# Patient Record
Sex: FEMALE | URBAN - METROPOLITAN AREA
[De-identification: names, ages, dates, MRNs, and addresses within clinical notes are randomized per-mention and may not be internally consistent; named-entity substitution may affect disease eponyms.]

---

## 2024-01-29 ENCOUNTER — HOSPITAL ENCOUNTER (EMERGENCY)
Facility: HOSPITAL | Age: 29
Discharge: HOME | End: 2024-01-30

## 2024-01-29 VITALS
HEIGHT: 65 IN | HEART RATE: 83 BPM | OXYGEN SATURATION: 100 % | BODY MASS INDEX: 32.99 KG/M2 | WEIGHT: 198 LBS | SYSTOLIC BLOOD PRESSURE: 102 MMHG | DIASTOLIC BLOOD PRESSURE: 68 MMHG | TEMPERATURE: 96.9 F | RESPIRATION RATE: 18 BRPM

## 2024-01-29 DIAGNOSIS — R35.0 URINARY FREQUENCY: ICD-10-CM

## 2024-01-29 DIAGNOSIS — K59.00 CONSTIPATION, UNSPECIFIED CONSTIPATION TYPE: Primary | ICD-10-CM

## 2024-01-29 PROCEDURE — 99283 EMERGENCY DEPT VISIT LOW MDM: CPT

## 2024-01-29 PROCEDURE — 99284 EMERGENCY DEPT VISIT MOD MDM: CPT

## 2024-01-29 ASSESSMENT — COLUMBIA-SUICIDE SEVERITY RATING SCALE - C-SSRS
1. IN THE PAST MONTH, HAVE YOU WISHED YOU WERE DEAD OR WISHED YOU COULD GO TO SLEEP AND NOT WAKE UP?: NO
6. HAVE YOU EVER DONE ANYTHING, STARTED TO DO ANYTHING, OR PREPARED TO DO ANYTHING TO END YOUR LIFE?: NO
2. HAVE YOU ACTUALLY HAD ANY THOUGHTS OF KILLING YOURSELF?: NO

## 2024-01-29 NOTE — Clinical Note
Radha Alvares was seen and treated in our emergency department on 1/29/2024.  She may return to work on 01/31/2024.       If you have any questions or concerns, please don't hesitate to call.      Angella Orr PA-C

## 2024-01-30 ENCOUNTER — APPOINTMENT (OUTPATIENT)
Dept: RADIOLOGY | Facility: HOSPITAL | Age: 29
End: 2024-01-30

## 2024-01-30 LAB
APPEARANCE UR: CLEAR
BILIRUB UR STRIP.AUTO-MCNC: NEGATIVE MG/DL
COLOR UR: YELLOW
GLUCOSE UR STRIP.AUTO-MCNC: NEGATIVE MG/DL
HOLD SPECIMEN: NORMAL
KETONES UR STRIP.AUTO-MCNC: NEGATIVE MG/DL
LEUKOCYTE ESTERASE UR QL STRIP.AUTO: ABNORMAL
MUCOUS THREADS #/AREA URNS AUTO: NORMAL /LPF
NITRITE UR QL STRIP.AUTO: NEGATIVE
PH UR STRIP.AUTO: 8 [PH]
PREGNANCY TEST URINE, POC: NEGATIVE
PROT UR STRIP.AUTO-MCNC: NEGATIVE MG/DL
RBC # UR STRIP.AUTO: NEGATIVE /UL
RBC #/AREA URNS AUTO: NORMAL /HPF
SP GR UR STRIP.AUTO: 1.02
UROBILINOGEN UR STRIP.AUTO-MCNC: 2 MG/DL
WBC #/AREA URNS AUTO: NORMAL /HPF

## 2024-01-30 PROCEDURE — 74018 RADEX ABDOMEN 1 VIEW: CPT

## 2024-01-30 PROCEDURE — 74018 RADEX ABDOMEN 1 VIEW: CPT | Performed by: STUDENT IN AN ORGANIZED HEALTH CARE EDUCATION/TRAINING PROGRAM

## 2024-01-30 PROCEDURE — 81001 URINALYSIS AUTO W/SCOPE: CPT | Performed by: EMERGENCY MEDICINE

## 2024-01-30 PROCEDURE — 81025 URINE PREGNANCY TEST: CPT

## 2024-01-30 RX ORDER — POLYETHYLENE GLYCOL 3350 17 G/17G
17 POWDER, FOR SOLUTION ORAL DAILY
Qty: 51 G | Refills: 0 | Status: SHIPPED | OUTPATIENT
Start: 2024-01-30 | End: 2024-01-30 | Stop reason: SDUPTHER

## 2024-01-30 RX ORDER — DOCUSATE SODIUM 100 MG/1
200 CAPSULE, LIQUID FILLED ORAL EVERY 12 HOURS
Qty: 8 CAPSULE | Refills: 0 | Status: SHIPPED | OUTPATIENT
Start: 2024-01-30 | End: 2024-02-01

## 2024-01-30 RX ORDER — POLYETHYLENE GLYCOL 3350 17 G/17G
17 POWDER, FOR SOLUTION ORAL DAILY
Qty: 51 G | Refills: 0 | Status: SHIPPED | OUTPATIENT
Start: 2024-01-30 | End: 2024-02-02

## 2024-01-30 RX ORDER — DOCUSATE SODIUM 100 MG/1
200 CAPSULE, LIQUID FILLED ORAL EVERY 12 HOURS
Qty: 8 CAPSULE | Refills: 0 | Status: SHIPPED | OUTPATIENT
Start: 2024-01-30 | End: 2024-01-30 | Stop reason: SDUPTHER

## 2024-01-30 NOTE — DISCHARGE INSTRUCTIONS
Begin taking the bowel regiment I prescribed as directed.  Be sure to increase your daily fluid intake and drink at least eight 8 fluid ounce glasses of water per day.  You may also consider drinking some prune juice as this can also help stimulate bowel movements.    Please return to the ED with any new or worsening symptoms or if you are no longer passing gas and still unable to have a bowel movement even after this treatment.    Your urine analysis results revealed no signs of acute UTI.  Therefore you do not need any antibiotics at this time.  Your abdominal pain is likely secondary to being constipated as x-ray imaging showed a large amount of stool within your bowel.

## 2024-01-30 NOTE — ED TRIAGE NOTES
"Pt is concerned for kidney infection and no BM for 8 days. Having increased stomach and back pain that started \"a while\" and is progressing and today it got \"unbearable.\" Pt admits to taking a stool softener with no relief.   "

## 2024-01-30 NOTE — ED PROVIDER NOTES
HPI    CC: Constipation, Urinary frequency/urge  HPI:   Radha Alvares is a 28 year old female with no reported PMH whom presents today with complaints of increased urinary frequency and urge for the past several weeks as well as 5 days of constipation and generalized abdominal discomfort. Reports concern that symptoms could be secondary to kidney infection. States no fever, chills, nausea, emesis, diarrhea or heartburn. No blood per rectum, rectal pain/itching or concern for STI/new sex partners. Denies any hematuria or changes in vaginal discharge. Reports urinary frequency and dysuria initially onset few weeks ago, appeared to get better but then came back a few days ago. Reports having to urinate immediately after drinking water but then not much comes out. Patient is not a diabetic.   In regards to constipation, reports last BM was 5 days ago and endorses extreme difficulty and straining. Her stool at that time was small and extremely firm ' dried-out'. Patient still passing gas, tolerating food and liquids without regurgitation. She describes abdominal pain as pressure-like fullness and reports some radiating to her generalized low back. No hematochezia, mucus-coated stools, melena. No prior history of hemorrhoids. No recent travel, abdominal surgeries or suspicious food intake. She attempted to take OTC stool softener yesterday evening without relief.         PMHx:  PSH: reviewed per EMR  Allergies: Reviewed per EMR  Medications: Reviewed per EMR  FH: Noncontributory  Social Hx: Denies tobacco. Denies EtOH, illicit substance use.      ROS: All other systems were reviewed and negative.    Physical Exam:   GENERAL: Patient is well-appearing, cooperative. Vitals noted, NAD. Afebrile  HEAD: NC/AT.   NECK: Supple, full ROM. No lymphadenopathy.  EYES: PERRL, EOMI, anicteric sclera. No erythema or exudates.  ENMT: Hearing grossly intact. MMM, oronasopharynx is patent. Uvula midline. Normal phonation.  CV: Regular  "rate & rhythm. S1/S2 present. No murmur, gallops or rubs.  PULM: CTAB with normal effort. No wheezes, rales or rhonchi.  ABDOMEN: Soft, nontender to palpation and nondistended. No peritoneal signs. BS+ x 4. No HSM or pulsatile masses.  No CVA tenderness or suprapubic pain to palpation.  McBurney's point is nontender.  Psoas sign negative.  Law sign negative.  MSK: Full wt. bearing, Spontaneously CALLE x 4. Symmetric muscle bulk without gross step-off or deformities. No joint pain or effusions.   EXTREMITIES: WWP, 2+ bilateral RPs and DPPs. No pitting LE edema.  SKIN: Intact. No rash, lesions or discoloration. Cap refill <2s.  NEURO: AA&Ox3, Speech fluent. No focal deficits identified. Normal gait. Answering questions appropriately.    -------------------------------------------------------------------------------------------  ED Course & MDM   Triage notes and vital signs were reviewed. History and physical exam were performed. I also reviewed recent and relevant outside records for thorough HPI.    Medical Decision Making  Assessment & Plan/ED Course:   This is a nontoxic, well-appearing female presenting for constipation x 5 days as well as UTI symptoms ongoing for the past 2 weeks. She is still passing flatus. VSS on arrival and patient is in no acute distress. She is still tolerating food and liquids and reports decreased water intake due to overall \"abdominal discomfort\". No rectal pain, BRBPR or changes in vaginal discharge concerning for STI's. Her abdomen is soft, NTTP and non-distended without any peritoneal signs. Bowel sounds are present and normoactive x 4 quadrants. She has no CVA tenderness or suprapubic pain to palpation, therefore low suspicion for pelvic pathology. Differential Diagnoses Considered include: Volvulus, stool impaction, acute cystitis, overactive bladder (OAB). Low suspicion for perforated viscous, bowel obstruction, diverticulitis or other acute surgical intra-abdominal processes " given that patient is still passing flatus and no peritoneal signs on exam. Thus no advance imaging or additional laboratory work is warranted at this time. Similarly, I have low concern for pyelonephritis, nephrolithiasis, vulvovaginitis/vaginosis given absence of fever or CVA tenderness and flank to groin pain pattern. Will obtain UA to assess for infection and KUB    Amount and/or Complexity of Data Reviewed  Labs: ordered. Decision-making details documented in ED Course.  Radiology: ordered and independent interpretation performed. Decision-making details documented in ED Course.    Risk  Prescription drug management.      ED Course as of 01/30/24 0213   Tue Jan 30, 2024   0201 XR abdomen 1 view  KUB revealed nonobstructive bowel gas pattern with moderate to marked colonic fecal load suggestive of fecal matter mixed with air projecting over expected region of the rectum.  There is no evidence of free air or pneumoperitoneum noted [MV]   0201 Leukocyte Esterase, Urine(!): TRACE  UA positive for trace LEs, but nitrites are negative and there is no marked pyuria or presence of WBCs seen on microscopy.  Therefore very low concern for acute cystitis or other  infection at this time.  Urine cultures are still pending. [MV]      ED Course User Index  [MV] Angella Orr PA-C         Diagnoses as of 01/30/24 0213   Constipation, unspecified constipation type   Urinary frequency        Disposition: Home-going  Counseled patient regarding lab results, radiology results and suspected diagnosis. Advised to follow-up with PCP on out-patient basis. The patient has demonstrated clinical improvement and was discharged home in stable condition with prescriptions for Colace and Mirlax. She was also instructed to increase daily water intake and fiber. She may also consider drinking prune juice. ED return precautions were discussed and provided at time of discharge. Patient acknowledged their understanding and is amendable to the  treatment plan. All discharge instructions explained to patient and all questions were answered.    Angella Orr PA-C  [facility]    Disclaimer: This note was dictated by speech recognition. Minor errors in transcription may be present. Please call if questions.  -------------------------------------------------------------------------------------------       Angella Orr PA-C  01/30/24 0229

## 2024-08-29 ENCOUNTER — HOSPITAL ENCOUNTER (EMERGENCY)
Facility: HOSPITAL | Age: 29
Discharge: HOME | End: 2024-08-29
Payer: MEDICAID

## 2024-08-29 VITALS
TEMPERATURE: 97.5 F | HEART RATE: 79 BPM | SYSTOLIC BLOOD PRESSURE: 101 MMHG | RESPIRATION RATE: 16 BRPM | OXYGEN SATURATION: 100 % | DIASTOLIC BLOOD PRESSURE: 74 MMHG

## 2024-08-29 DIAGNOSIS — H57.89 IRRITATION OF BOTH EYES: Primary | ICD-10-CM

## 2024-08-29 PROCEDURE — 99283 EMERGENCY DEPT VISIT LOW MDM: CPT | Performed by: PHYSICIAN ASSISTANT

## 2024-08-29 RX ORDER — OFLOXACIN 3 MG/ML
2 SOLUTION/ DROPS OPHTHALMIC EVERY 4 HOURS
Qty: 15 ML | Refills: 0 | Status: SHIPPED | OUTPATIENT
Start: 2024-08-29 | End: 2024-09-03

## 2024-08-29 ASSESSMENT — COLUMBIA-SUICIDE SEVERITY RATING SCALE - C-SSRS
6. HAVE YOU EVER DONE ANYTHING, STARTED TO DO ANYTHING, OR PREPARED TO DO ANYTHING TO END YOUR LIFE?: NO
1. IN THE PAST MONTH, HAVE YOU WISHED YOU WERE DEAD OR WISHED YOU COULD GO TO SLEEP AND NOT WAKE UP?: NO
2. HAVE YOU ACTUALLY HAD ANY THOUGHTS OF KILLING YOURSELF?: NO

## 2024-08-29 ASSESSMENT — PAIN SCALES - GENERAL: PAINLEVEL_OUTOF10: 8

## 2024-08-29 ASSESSMENT — PAIN - FUNCTIONAL ASSESSMENT: PAIN_FUNCTIONAL_ASSESSMENT: 0-10

## 2024-08-29 NOTE — ED PROVIDER NOTES
HPI   Chief Complaint   Patient presents with    Eye Problem       HPI: Patient is a 28-year-old female who presents to the ED for bilateral eye irritation and drainage.  Patient states that yesterday she used contact solution that after using it looked at the packaging and noticed that it was over a year .  States that today she woke up and had bilateral eye irritation, burning and drainage with the left greater than the right.  She denies any eye injury, vision loss, photophobia.    ------------------------------------------------------------------------------------------------------------------------------------------  ROS: a ten point review of systems was performed and was negative except as per HPI.  ------------------------------------------------------------------------------------------------------------------------------------------  PMH / PSH: as per HPI, otherwise reviewed   MEDS: as per HPI, otherwise reviewed in EMR  ALLERGIES: as per HPI, otherwise reviewed in EMR  SocH:  as per HPI, otherwise reviewed in EMR  FH:  as per HPI, otherwise reviewed in EMR   ------------------------------------------------------------------------------------------------------------------------------------------  Physical Exam:  VS: As documented in the triage note and EMR flowsheet from this visit was reviewed  General: Well appearing. No acute distress.   Eyes:  Extraocular movements grossly intact. No scleral icterus.  Conjunctiva or mildly injected, left greater than right.  Clear drainage noted bilaterally.  Pupils are equal round and reactive to light.  Head: Atraumatic. Normocephalic.     Neck: No meningismus. No gross masses. Full movement through range of motion  ENT: Posterior oropharynx shows no erythema, exudate or edema.  Uvula is midline without edema.  No stridor or trismus  CV: Regular rhythm. No murmurs, rubs, gallops appreciated.   Resp: Clear to auscultation bilaterally. No respiratory distress.     Neuro: CN II-VII intact. A&O x3. Speech fluent. Alert. Moving all extremities. Ambulates with normal gait  Psych: Appropriate mood and affect for situation  ------------------------------------------------------------------------------------------------------------------------------------------  Hospital Course / Medical Decision Making: Patient is a 28-year-old female presents to the ED for bilateral eye irritation and drainage after using  contact solution yesterday.  On examination, patient well-appearing.  Vitals are stable.  Denies any vision loss.  She does have mild conjunctival injection and clear drainage noted bilaterally.  Her pupils are equal and reactive.  Extraocular movements are intact.  Patient written prescriptions for ofloxacin ophthalmic drops to cover for infection and artificial tears for comfort. As a result of the work-up, the patient was discharged home in stable condition.  They were informed of the diagnosis and instructed to come back with any concerns or worsening of condition.  Agreeable to the plan as discussed above.  Patient given the opportunity to ask questions.  All of the patient's questions were answered.               Patient History   No past medical history on file.  No past surgical history on file.  No family history on file.  Social History     Tobacco Use    Smoking status: Not on file    Smokeless tobacco: Not on file   Substance Use Topics    Alcohol use: Not on file    Drug use: Not on file       Physical Exam   ED Triage Vitals [24 1446]   Temperature Heart Rate Respirations BP   36.4 °C (97.5 °F) 79 16 101/74      Pulse Ox Temp src Heart Rate Source Patient Position   100 % -- -- --      BP Location FiO2 (%)     -- --       Physical Exam      ED Course & MDM   Diagnoses as of 24 1542   Irritation of both eyes                 No data recorded     Pearl Coma Scale Score: 15 (24 1445 : Ade Zafar RN)                           Medical  Decision Making      Procedure  Procedures     Tyra Hyde PA-C  08/29/24 1546

## 2025-03-22 ENCOUNTER — HOSPITAL ENCOUNTER (EMERGENCY)
Facility: HOSPITAL | Age: 30
Discharge: HOME | End: 2025-03-22
Attending: STUDENT IN AN ORGANIZED HEALTH CARE EDUCATION/TRAINING PROGRAM
Payer: COMMERCIAL

## 2025-03-22 VITALS
WEIGHT: 170 LBS | RESPIRATION RATE: 16 BRPM | OXYGEN SATURATION: 97 % | DIASTOLIC BLOOD PRESSURE: 81 MMHG | SYSTOLIC BLOOD PRESSURE: 124 MMHG | TEMPERATURE: 97.8 F | HEIGHT: 65 IN | HEART RATE: 90 BPM | BODY MASS INDEX: 28.32 KG/M2

## 2025-03-22 DIAGNOSIS — M54.9 BACK PAIN, UNSPECIFIED BACK LOCATION, UNSPECIFIED BACK PAIN LATERALITY, UNSPECIFIED CHRONICITY: Primary | ICD-10-CM

## 2025-03-22 DIAGNOSIS — A59.9 TRICHOMONAS INFECTION: ICD-10-CM

## 2025-03-22 LAB
APPEARANCE UR: CLEAR
BACTERIA #/AREA URNS AUTO: ABNORMAL /HPF
BILIRUB UR STRIP.AUTO-MCNC: NEGATIVE MG/DL
CLUE CELLS SPEC QL WET PREP: ABNORMAL
COLOR UR: ABNORMAL
GLUCOSE UR STRIP.AUTO-MCNC: NORMAL MG/DL
KETONES UR STRIP.AUTO-MCNC: NEGATIVE MG/DL
LEUKOCYTE ESTERASE UR QL STRIP.AUTO: ABNORMAL
MUCOUS THREADS #/AREA URNS AUTO: ABNORMAL /LPF
NITRITE UR QL STRIP.AUTO: NEGATIVE
PH UR STRIP.AUTO: 7.5 [PH]
PROT UR STRIP.AUTO-MCNC: NEGATIVE MG/DL
RBC # UR STRIP.AUTO: NEGATIVE MG/DL
RBC #/AREA URNS AUTO: ABNORMAL /HPF
SP GR UR STRIP.AUTO: 1.02
SQUAMOUS #/AREA URNS AUTO: ABNORMAL /HPF
T VAGINALIS SPEC QL WET PREP: PRESENT
UROBILINOGEN UR STRIP.AUTO-MCNC: NORMAL MG/DL
WBC #/AREA URNS AUTO: ABNORMAL /HPF
WBC VAG QL WET PREP: ABNORMAL
YEAST VAG QL WET PREP: ABNORMAL

## 2025-03-22 PROCEDURE — 99284 EMERGENCY DEPT VISIT MOD MDM: CPT

## 2025-03-22 PROCEDURE — 2500000001 HC RX 250 WO HCPCS SELF ADMINISTERED DRUGS (ALT 637 FOR MEDICARE OP)

## 2025-03-22 PROCEDURE — 87210 SMEAR WET MOUNT SALINE/INK: CPT

## 2025-03-22 PROCEDURE — 99283 EMERGENCY DEPT VISIT LOW MDM: CPT | Performed by: STUDENT IN AN ORGANIZED HEALTH CARE EDUCATION/TRAINING PROGRAM

## 2025-03-22 PROCEDURE — 99284 EMERGENCY DEPT VISIT MOD MDM: CPT | Performed by: STUDENT IN AN ORGANIZED HEALTH CARE EDUCATION/TRAINING PROGRAM

## 2025-03-22 PROCEDURE — 81001 URINALYSIS AUTO W/SCOPE: CPT

## 2025-03-22 PROCEDURE — 87491 CHLMYD TRACH DNA AMP PROBE: CPT

## 2025-03-22 PROCEDURE — 87086 URINE CULTURE/COLONY COUNT: CPT

## 2025-03-22 RX ORDER — METHOCARBAMOL 500 MG/1
1000 TABLET, FILM COATED ORAL 3 TIMES DAILY
Qty: 42 TABLET | Refills: 0 | Status: SHIPPED | OUTPATIENT
Start: 2025-03-22 | End: 2025-03-29

## 2025-03-22 RX ORDER — ACETAMINOPHEN 325 MG/1
650 TABLET ORAL ONCE
Status: COMPLETED | OUTPATIENT
Start: 2025-03-22 | End: 2025-03-22

## 2025-03-22 RX ORDER — METRONIDAZOLE 500 MG/1
500 TABLET ORAL 2 TIMES DAILY
Qty: 14 TABLET | Refills: 0 | Status: SHIPPED | OUTPATIENT
Start: 2025-03-22 | End: 2025-03-29

## 2025-03-22 RX ORDER — METHOCARBAMOL 500 MG/1
1000 TABLET, FILM COATED ORAL ONCE
Status: COMPLETED | OUTPATIENT
Start: 2025-03-22 | End: 2025-03-22

## 2025-03-22 RX ADMIN — ACETAMINOPHEN 650 MG: 325 TABLET ORAL at 13:24

## 2025-03-22 RX ADMIN — METHOCARBAMOL 1000 MG: 500 TABLET ORAL at 13:24

## 2025-03-22 ASSESSMENT — PAIN - FUNCTIONAL ASSESSMENT: PAIN_FUNCTIONAL_ASSESSMENT: 0-10

## 2025-03-22 ASSESSMENT — PAIN SCALES - GENERAL: PAINLEVEL_OUTOF10: 8

## 2025-03-22 NOTE — DISCHARGE INSTRUCTIONS
You are seen the emergency department for your lower back pain, did not do any imaging at this time as you had to leave from the ER, but we have no strong indication to think that anything acute is going on.  If you have worsening pain or have any concerns please return to emergency department.
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
## 2025-03-23 LAB
C TRACH RRNA SPEC QL NAA+PROBE: POSITIVE
HOLD SPECIMEN: NORMAL
N GONORRHOEA DNA SPEC QL PROBE+SIG AMP: NEGATIVE

## 2025-03-24 ENCOUNTER — TELEPHONE (OUTPATIENT)
Dept: PHARMACY | Facility: HOSPITAL | Age: 30
End: 2025-03-24
Payer: COMMERCIAL

## 2025-03-24 DIAGNOSIS — A74.9 CHLAMYDIA: Primary | ICD-10-CM

## 2025-03-24 RX ORDER — DOXYCYCLINE 100 MG/1
100 CAPSULE ORAL 2 TIMES DAILY
Qty: 14 CAPSULE | Refills: 0 | Status: SHIPPED | OUTPATIENT
Start: 2025-03-24 | End: 2025-03-31

## 2025-03-24 NOTE — PROGRESS NOTES
EDPD Note: Initiation     Contacted Radha Alvares regarding a positive chlamydia culture/result that was taken during their recent emergency room visit. I completed education with patient. The patient is not being treated appropriately.    Pt seen in ED for back pain and vaginal bleeding. Pt tested positive for Trichomonas and Chlamyida. Pt was discharged with metronidazole 500 mg BID x 7 days. Today pt aware of result and will start doxycycline 100 mg BID x 7 days.     The following prescription was sent to the patient's preferred pharmacy. No further follow up needed from EDPD Team.     Drug: Doxycycline 100mg BID x 7 days      Susceptibility data from last 90 days.  Collected Specimen Info Organism   03/22/25 Urine from Clean Catch/Voided Gram Negative Bacilli       If there are any other questions for the ED Post-Discharge Culture Follow Up Team, please contact [phone]. Fax: [phone].    Ellie Dumont RPh

## 2025-03-25 LAB — BACTERIA UR CULT: ABNORMAL

## 2025-05-18 ENCOUNTER — HOSPITAL ENCOUNTER (EMERGENCY)
Facility: HOSPITAL | Age: 30
Discharge: HOME | End: 2025-05-19
Payer: COMMERCIAL

## 2025-05-18 VITALS
BODY MASS INDEX: 28.32 KG/M2 | HEART RATE: 80 BPM | SYSTOLIC BLOOD PRESSURE: 115 MMHG | TEMPERATURE: 95.6 F | OXYGEN SATURATION: 99 % | WEIGHT: 170 LBS | RESPIRATION RATE: 18 BRPM | HEIGHT: 65 IN | DIASTOLIC BLOOD PRESSURE: 77 MMHG

## 2025-05-18 DIAGNOSIS — R10.9 LEFT SIDED ABDOMINAL PAIN: ICD-10-CM

## 2025-05-18 DIAGNOSIS — R11.0 NAUSEA: Primary | ICD-10-CM

## 2025-05-18 PROCEDURE — 99284 EMERGENCY DEPT VISIT MOD MDM: CPT

## 2025-05-18 PROCEDURE — 93010 ELECTROCARDIOGRAM REPORT: CPT

## 2025-05-18 RX ORDER — MORPHINE SULFATE 4 MG/ML
4 INJECTION INTRAVENOUS ONCE
Status: COMPLETED | OUTPATIENT
Start: 2025-05-18 | End: 2025-05-19

## 2025-05-18 RX ORDER — ONDANSETRON HYDROCHLORIDE 2 MG/ML
4 INJECTION, SOLUTION INTRAVENOUS ONCE
Status: COMPLETED | OUTPATIENT
Start: 2025-05-18 | End: 2025-05-19

## 2025-05-18 ASSESSMENT — COLUMBIA-SUICIDE SEVERITY RATING SCALE - C-SSRS
6. HAVE YOU EVER DONE ANYTHING, STARTED TO DO ANYTHING, OR PREPARED TO DO ANYTHING TO END YOUR LIFE?: NO
2. HAVE YOU ACTUALLY HAD ANY THOUGHTS OF KILLING YOURSELF?: NO
1. IN THE PAST MONTH, HAVE YOU WISHED YOU WERE DEAD OR WISHED YOU COULD GO TO SLEEP AND NOT WAKE UP?: NO

## 2025-05-18 ASSESSMENT — PAIN - FUNCTIONAL ASSESSMENT: PAIN_FUNCTIONAL_ASSESSMENT: 0-10

## 2025-05-18 ASSESSMENT — PAIN SCALES - GENERAL: PAINLEVEL_OUTOF10: 10 - WORST POSSIBLE PAIN

## 2025-05-18 ASSESSMENT — PAIN DESCRIPTION - LOCATION: LOCATION: ABDOMEN

## 2025-05-18 ASSESSMENT — PAIN DESCRIPTION - PAIN TYPE: TYPE: ACUTE PAIN

## 2025-05-19 ENCOUNTER — CLINICAL SUPPORT (OUTPATIENT)
Dept: EMERGENCY MEDICINE | Facility: HOSPITAL | Age: 30
End: 2025-05-19
Payer: COMMERCIAL

## 2025-05-19 LAB
ALBUMIN SERPL BCP-MCNC: 4.9 G/DL (ref 3.4–5)
ALP SERPL-CCNC: 61 U/L (ref 33–110)
ALT SERPL W P-5'-P-CCNC: 16 U/L (ref 7–45)
ANION GAP SERPL CALC-SCNC: 15 MMOL/L (ref 10–20)
APPEARANCE UR: CLEAR
AST SERPL W P-5'-P-CCNC: 17 U/L (ref 9–39)
BASOPHILS # BLD AUTO: 0.07 X10*3/UL (ref 0–0.1)
BASOPHILS NFR BLD AUTO: 0.8 %
BILIRUB SERPL-MCNC: 0.4 MG/DL (ref 0–1.2)
BILIRUB UR STRIP.AUTO-MCNC: NEGATIVE MG/DL
BUN SERPL-MCNC: 6 MG/DL (ref 6–23)
C TRACH RRNA SPEC QL NAA+PROBE: NEGATIVE
CALCIUM SERPL-MCNC: 9.7 MG/DL (ref 8.6–10.6)
CHLORIDE SERPL-SCNC: 101 MMOL/L (ref 98–107)
CLUE CELLS SPEC QL WET PREP: NORMAL
CO2 SERPL-SCNC: 24 MMOL/L (ref 21–32)
COLOR UR: YELLOW
CREAT SERPL-MCNC: 0.56 MG/DL (ref 0.5–1.05)
EGFRCR SERPLBLD CKD-EPI 2021: >90 ML/MIN/1.73M*2
EOSINOPHIL # BLD AUTO: 0.22 X10*3/UL (ref 0–0.7)
EOSINOPHIL NFR BLD AUTO: 2.5 %
ERYTHROCYTE [DISTWIDTH] IN BLOOD BY AUTOMATED COUNT: 13.8 % (ref 11.5–14.5)
GLUCOSE SERPL-MCNC: 99 MG/DL (ref 74–99)
GLUCOSE UR STRIP.AUTO-MCNC: NORMAL MG/DL
HCT VFR BLD AUTO: 42.1 % (ref 36–46)
HGB BLD-MCNC: 14 G/DL (ref 12–16)
HOLD SPECIMEN: 293
HOLD SPECIMEN: 293
IMM GRANULOCYTES # BLD AUTO: 0.03 X10*3/UL (ref 0–0.7)
IMM GRANULOCYTES NFR BLD AUTO: 0.3 % (ref 0–0.9)
KETONES UR STRIP.AUTO-MCNC: ABNORMAL MG/DL
LEUKOCYTE ESTERASE UR QL STRIP.AUTO: NEGATIVE
LIPASE SERPL-CCNC: 22 U/L (ref 9–82)
LYMPHOCYTES # BLD AUTO: 2.55 X10*3/UL (ref 1.2–4.8)
LYMPHOCYTES NFR BLD AUTO: 29.4 %
MCH RBC QN AUTO: 31.3 PG (ref 26–34)
MCHC RBC AUTO-ENTMCNC: 33.3 G/DL (ref 32–36)
MCV RBC AUTO: 94 FL (ref 80–100)
MONOCYTES # BLD AUTO: 0.6 X10*3/UL (ref 0.1–1)
MONOCYTES NFR BLD AUTO: 6.9 %
N GONORRHOEA DNA SPEC QL PROBE+SIG AMP: NEGATIVE
NEUTROPHILS # BLD AUTO: 5.2 X10*3/UL (ref 1.2–7.7)
NEUTROPHILS NFR BLD AUTO: 60.1 %
NITRITE UR QL STRIP.AUTO: NEGATIVE
NRBC BLD-RTO: 0 /100 WBCS (ref 0–0)
PH UR STRIP.AUTO: 6 [PH]
PLATELET # BLD AUTO: 436 X10*3/UL (ref 150–450)
POTASSIUM SERPL-SCNC: 3.8 MMOL/L (ref 3.5–5.3)
PROT SERPL-MCNC: 8.6 G/DL (ref 6.4–8.2)
PROT UR STRIP.AUTO-MCNC: NEGATIVE MG/DL
RBC # BLD AUTO: 4.47 X10*6/UL (ref 4–5.2)
RBC # UR STRIP.AUTO: NEGATIVE MG/DL
SODIUM SERPL-SCNC: 136 MMOL/L (ref 136–145)
SP GR UR STRIP.AUTO: 1.02
T VAGINALIS RRNA SPEC QL NAA+PROBE: NEGATIVE
T VAGINALIS SPEC QL WET PREP: NORMAL
TRICHOMONAS REFLEX COMMENT: NORMAL
UROBILINOGEN UR STRIP.AUTO-MCNC: NORMAL MG/DL
WBC # BLD AUTO: 8.7 X10*3/UL (ref 4.4–11.3)
WBC VAG QL WET PREP: NORMAL
YEAST VAG QL WET PREP: NORMAL

## 2025-05-19 PROCEDURE — 87210 SMEAR WET MOUNT SALINE/INK: CPT | Mod: 59

## 2025-05-19 PROCEDURE — 2500000004 HC RX 250 GENERAL PHARMACY W/ HCPCS (ALT 636 FOR OP/ED): Mod: JZ

## 2025-05-19 PROCEDURE — 81003 URINALYSIS AUTO W/O SCOPE: CPT

## 2025-05-19 PROCEDURE — 87491 CHLMYD TRACH DNA AMP PROBE: CPT

## 2025-05-19 PROCEDURE — 96375 TX/PRO/DX INJ NEW DRUG ADDON: CPT

## 2025-05-19 PROCEDURE — 87661 TRICHOMONAS VAGINALIS AMPLIF: CPT

## 2025-05-19 PROCEDURE — 96374 THER/PROPH/DIAG INJ IV PUSH: CPT

## 2025-05-19 PROCEDURE — 85025 COMPLETE CBC W/AUTO DIFF WBC: CPT

## 2025-05-19 PROCEDURE — 93005 ELECTROCARDIOGRAM TRACING: CPT

## 2025-05-19 PROCEDURE — 36415 COLL VENOUS BLD VENIPUNCTURE: CPT

## 2025-05-19 PROCEDURE — 83690 ASSAY OF LIPASE: CPT

## 2025-05-19 PROCEDURE — 96361 HYDRATE IV INFUSION ADD-ON: CPT

## 2025-05-19 PROCEDURE — 80053 COMPREHEN METABOLIC PANEL: CPT

## 2025-05-19 RX ORDER — ONDANSETRON 4 MG/1
4 TABLET, FILM COATED ORAL EVERY 6 HOURS
Qty: 12 TABLET | Refills: 0 | Status: SHIPPED | OUTPATIENT
Start: 2025-05-19 | End: 2025-05-22

## 2025-05-19 RX ADMIN — ONDANSETRON 4 MG: 2 INJECTION INTRAMUSCULAR; INTRAVENOUS at 00:43

## 2025-05-19 RX ADMIN — SODIUM CHLORIDE, SODIUM LACTATE, POTASSIUM CHLORIDE, AND CALCIUM CHLORIDE 1000 ML: 600; 310; 30; 20 INJECTION, SOLUTION INTRAVENOUS at 00:48

## 2025-05-19 RX ADMIN — MORPHINE SULFATE 4 MG: 4 INJECTION, SOLUTION INTRAMUSCULAR; INTRAVENOUS at 00:44

## 2025-05-19 NOTE — ED TRIAGE NOTES
Pt presents to the ED d/t abdominal pain. Pt states it started today and became excruciating when she would try and move. Pt denies any other issues at this time.

## 2025-05-19 NOTE — ED PROVIDER NOTES
History of Present Illness     History provided by: Patient  Limitations to History: None  External Records Reviewed with Brief Summary: None    HPI:  Radha Alvares is a G5, P5 29 y.o. female who presents to the emergency department for concern of nausea and abdominal pain that started today.  She reports when she gets up to stand, feels like something is, fall out of her, states she felt like she was having contractions however denies pregnancy.  She states at times it feels like something is Benefiel out of her rectum.  She reports becoming dizzy and sweaty and was having difficulty standing up due to pain.  She reports being sexually active and is not on any birth control. She denies fever, chills, body aches, chest pain, vomiting, constipation or diarrhea.    Physical Exam   Triage vitals:  T 35.3 °C (95.6 °F)  HR 80  /77  RR 18  O2 99 % None (Room air)    Physical Exam  Vitals and nursing note reviewed.   Constitutional:       Appearance: She is not toxic-appearing.   HENT:      Head: Normocephalic and atraumatic.      Mouth/Throat:      Mouth: Mucous membranes are moist.      Pharynx: Oropharynx is clear.   Eyes:      Extraocular Movements: Extraocular movements intact.   Cardiovascular:      Rate and Rhythm: Normal rate and regular rhythm.      Heart sounds: Normal heart sounds.   Pulmonary:      Effort: Pulmonary effort is normal.      Breath sounds: Normal breath sounds.   Abdominal:      General: Bowel sounds are normal. There is no distension or abdominal bruit.      Palpations: Abdomen is soft. There is no mass or pulsatile mass.      Tenderness: There is abdominal tenderness in the left upper quadrant and left lower quadrant. There is no right CVA tenderness or left CVA tenderness.   Skin:     General: Skin is warm and dry.   Neurological:      General: No focal deficit present.      Mental Status: She is alert and oriented to person, place, and time.   Psychiatric:         Mood and Affect:  Mood normal.         Behavior: Behavior normal.          Medical Decision Making & ED Course   Medical Decision Makin y.o. female who presents to the emergency department for dizziness, nausea and abdominal pain.    Exam is notable for LUQ and LLQ tenderness upon palpation.  Abdomen soft, nondistended.  No palpable or pulsatile masses.  No CVA tenderness.    PELVIC EXAM: Performed in the company of a female nurse chaperone.  External genitalia: No lesions. Speculum exam: Cervical os closed. No blood in vaginal vault. No discharge. Bimanual exam: No cervical motion tenderness. No adnexal tenderness or masses palpated.     Findings to obtain EKG, basic labs, lipase, urinalysis, urine hCG, STD swabs and CT abdomen/pelvis.  Zofran, morphine and IV fluids ordered.  EKG showed normal sinus rhythm, 70 bpm, no acute ischemic changes.  Labs showed no leukocytosis, no acute anemia, no electrolyte derangement, no SURJIT.  Lipase within normal range.  Urinalysis showed no infection.  No trichomonas, BV or yeast shown on the wet prep.  Posttreatment assessment, patient reported symptoms improved, abdominal pain has resolved and is able to tolerated p.o.   Given patient is afebrile, abdominal pain has resolved, labs shows no leukocytosis or elevated lipase, low suspicion for infection and acute intra-abdominal pathology, emergent CT of the abdomen deferred.  I did discuss this option with the patient who agrees. Low suspicion for obstruction given patient reports having regular bowel movements.  Patient educated to stay hydrated, a prescription for Zofran provided and instructed to follow-up with the [facility] clinic.  A referral for primary care provider was submitted.    Differential diagnoses considered include but are not limited to: Pregnancy, infection, acute intra-abdominal pathology, obstruction, STDs, dehydration     Social Determinants of Health which Significantly Impact Care: None identified     EKG Independent  Interpretation: EKG interpreted by myself. Please see ED Course for full interpretation.    Independent Result Review and Interpretation: Relevant laboratory and radiographic results were reviewed and independently interpreted by myself.  As necessary, they are commented on in the ED Course.    Chronic conditions affecting the patient's care: As documented above in [facility]    The patient was discussed with the following consultants/services: None    Care Considerations: As documented above in [facility]    ED Course:  ED Course as of 05/20/25 2209   Sun May 18, 2025   2310 ECG 12 lead  Rate: 70 bpm  Rhythm: Normal sinus rhythm  Axis: Normal  NJ interval: Normal  ST Segment: No ST elevation  Comparison to Prior: None [ED]      ED Course User Index  [ED] GOPI Yoder         Diagnoses as of 05/20/25 2209   Nausea   Left sided abdominal pain     Disposition   Discharge    Procedures   Procedures    Patient was seen independently    GOPI Yoder  Emergency Medicine     GOPI Yoder  05/20/25 2218

## 2025-05-19 NOTE — DISCHARGE INSTRUCTIONS
May take Zofran as needed for nausea  Stay hydrated  A referral for a primary care provider was submitted to establish care  If symptoms worsen or new symptoms present return to the emergency department

## 2025-05-20 LAB
ATRIAL RATE: 70 BPM
P AXIS: 84 DEGREES
P OFFSET: 200 MS
P ONSET: 147 MS
PR INTERVAL: 154 MS
Q ONSET: 224 MS
QRS COUNT: 11 BEATS
QRS DURATION: 76 MS
QT INTERVAL: 388 MS
QTC CALCULATION(BAZETT): 419 MS
QTC FREDERICIA: 408 MS
R AXIS: 51 DEGREES
T AXIS: 64 DEGREES
T OFFSET: 418 MS
VENTRICULAR RATE: 70 BPM